# Patient Record
Sex: MALE | Race: ASIAN | Employment: OTHER | URBAN - METROPOLITAN AREA
[De-identification: names, ages, dates, MRNs, and addresses within clinical notes are randomized per-mention and may not be internally consistent; named-entity substitution may affect disease eponyms.]

---

## 2017-03-02 PROBLEM — M54.40 ACUTE LEFT-SIDED LOW BACK PAIN WITH SCIATICA: Status: RESOLVED | Noted: 2017-03-02 | Resolved: 2017-03-02

## 2017-03-02 PROBLEM — M54.40 ACUTE LEFT-SIDED LOW BACK PAIN WITH SCIATICA: Status: ACTIVE | Noted: 2017-03-02

## 2017-03-02 PROBLEM — M54.42 ACUTE LEFT-SIDED LOW BACK PAIN WITH LEFT-SIDED SCIATICA: Status: ACTIVE | Noted: 2017-03-02

## 2017-09-07 PROBLEM — R91.8 PULMONARY NODULES: Status: ACTIVE | Noted: 2017-09-07

## 2017-09-07 PROBLEM — M54.42 ACUTE LEFT-SIDED LOW BACK PAIN WITH LEFT-SIDED SCIATICA: Status: RESOLVED | Noted: 2017-03-02 | Resolved: 2017-09-07

## 2017-09-07 PROCEDURE — 82043 UR ALBUMIN QUANTITATIVE: CPT | Performed by: INTERNAL MEDICINE

## 2017-09-07 PROCEDURE — 82570 ASSAY OF URINE CREATININE: CPT | Performed by: INTERNAL MEDICINE

## 2017-12-22 PROCEDURE — 36415 COLL VENOUS BLD VENIPUNCTURE: CPT | Performed by: UROLOGY

## 2017-12-22 PROCEDURE — 84403 ASSAY OF TOTAL TESTOSTERONE: CPT | Performed by: UROLOGY

## 2017-12-22 PROCEDURE — 84402 ASSAY OF FREE TESTOSTERONE: CPT | Performed by: UROLOGY

## 2019-08-08 PROBLEM — N18.30 CKD (CHRONIC KIDNEY DISEASE) STAGE 3, GFR 30-59 ML/MIN (HCC): Status: ACTIVE | Noted: 2019-08-08

## 2019-10-18 PROBLEM — F10.29 ALCOHOL DEPENDENCE WITH UNSPECIFIED ALCOHOL-INDUCED DISORDER (HCC): Status: ACTIVE | Noted: 2019-10-18

## 2019-10-18 PROBLEM — N18.30 CKD (CHRONIC KIDNEY DISEASE) STAGE 3, GFR 30-59 ML/MIN (HCC): Status: RESOLVED | Noted: 2019-08-08 | Resolved: 2019-10-18

## 2019-10-18 PROBLEM — G31.9 CEREBRAL ATROPHY (HCC): Status: ACTIVE | Noted: 2019-10-18

## 2020-08-31 PROBLEM — E11.22 TYPE 2 DIABETES MELLITUS WITH STAGE 3 CHRONIC KIDNEY DISEASE, WITHOUT LONG-TERM CURRENT USE OF INSULIN (HCC): Status: ACTIVE | Noted: 2020-08-31

## 2020-08-31 PROBLEM — F03.90 DEMENTIA WITHOUT BEHAVIORAL DISTURBANCE, UNSPECIFIED DEMENTIA TYPE (HCC): Status: ACTIVE | Noted: 2020-08-31

## 2020-08-31 PROBLEM — N18.30 TYPE 2 DIABETES MELLITUS WITH STAGE 3 CHRONIC KIDNEY DISEASE, WITHOUT LONG-TERM CURRENT USE OF INSULIN (HCC): Status: ACTIVE | Noted: 2020-08-31

## 2020-08-31 PROBLEM — F03.90 DEMENTIA WITHOUT BEHAVIORAL DISTURBANCE, UNSPECIFIED DEMENTIA TYPE: Status: ACTIVE | Noted: 2020-08-31

## 2020-08-31 PROBLEM — F10.27 ALCOHOL DEPENDENCE WITH ALCOHOL-INDUCED PERSISTING DEMENTIA (HCC): Status: ACTIVE | Noted: 2020-08-31

## 2020-08-31 PROBLEM — I77.9 BILATERAL CAROTID ARTERY DISEASE, UNSPECIFIED TYPE (HCC): Status: ACTIVE | Noted: 2020-08-31

## 2021-02-26 PROBLEM — I65.23 CAROTID ATHEROSCLEROSIS, BILATERAL: Status: ACTIVE | Noted: 2021-02-26

## 2021-10-11 PROBLEM — F32.0 CURRENT MILD EPISODE OF MAJOR DEPRESSIVE DISORDER WITHOUT PRIOR EPISODE (HCC): Status: ACTIVE | Noted: 2021-10-11

## 2023-11-20 RX ORDER — MULTIVITAMIN WITH IRON
100 TABLET ORAL DAILY
COMMUNITY

## 2023-11-20 RX ORDER — GARLIC EXTRACT 500 MG
1 CAPSULE ORAL DAILY
COMMUNITY

## 2023-11-20 RX ORDER — MULTIVIT-MIN/IRON/FOLIC ACID/K 18-600-40
CAPSULE ORAL 2 TIMES DAILY
COMMUNITY

## 2023-11-22 ENCOUNTER — HOSPITAL ENCOUNTER (OUTPATIENT)
Facility: HOSPITAL | Age: 68
Setting detail: HOSPITAL OUTPATIENT SURGERY
Discharge: HOME | End: 2023-11-22
Attending: INTERNAL MEDICINE | Admitting: INTERNAL MEDICINE
Payer: MEDICARE

## 2023-11-22 VITALS
DIASTOLIC BLOOD PRESSURE: 84 MMHG | SYSTOLIC BLOOD PRESSURE: 103 MMHG | HEIGHT: 67 IN | WEIGHT: 140 LBS | RESPIRATION RATE: 11 BRPM | TEMPERATURE: 98 F | OXYGEN SATURATION: 99 % | HEART RATE: 64 BPM | BODY MASS INDEX: 21.97 KG/M2

## 2023-11-22 PROCEDURE — 0DBP8ZX EXCISION OF RECTUM, VIA NATURAL OR ARTIFICIAL OPENING ENDOSCOPIC, DIAGNOSTIC: ICD-10-PCS | Performed by: INTERNAL MEDICINE

## 2023-11-22 PROCEDURE — 0DB98ZX EXCISION OF DUODENUM, VIA NATURAL OR ARTIFICIAL OPENING ENDOSCOPIC, DIAGNOSTIC: ICD-10-PCS | Performed by: INTERNAL MEDICINE

## 2023-11-22 PROCEDURE — 88305 TISSUE EXAM BY PATHOLOGIST: CPT | Performed by: INTERNAL MEDICINE

## 2023-11-22 PROCEDURE — 99153 MOD SED SAME PHYS/QHP EA: CPT | Performed by: INTERNAL MEDICINE

## 2023-11-22 PROCEDURE — 99152 MOD SED SAME PHYS/QHP 5/>YRS: CPT | Performed by: INTERNAL MEDICINE

## 2023-11-22 PROCEDURE — 0DBE8ZX EXCISION OF LARGE INTESTINE, VIA NATURAL OR ARTIFICIAL OPENING ENDOSCOPIC, DIAGNOSTIC: ICD-10-PCS | Performed by: INTERNAL MEDICINE

## 2023-11-22 RX ORDER — SODIUM CHLORIDE, SODIUM LACTATE, POTASSIUM CHLORIDE, CALCIUM CHLORIDE 600; 310; 30; 20 MG/100ML; MG/100ML; MG/100ML; MG/100ML
INJECTION, SOLUTION INTRAVENOUS CONTINUOUS
Status: DISCONTINUED | OUTPATIENT
Start: 2023-11-22 | End: 2023-11-22

## 2023-11-22 RX ORDER — MIDAZOLAM HYDROCHLORIDE 1 MG/ML
INJECTION INTRAMUSCULAR; INTRAVENOUS
Status: DISCONTINUED | OUTPATIENT
Start: 2023-11-22 | End: 2023-11-22

## 2023-11-22 NOTE — OPERATIVE REPORT
PATIENT NAME: Kathrine Stout  MRN: [id number]  DATE OF OPERATION:  11/22/23  REFERRING PHYSICIAN: Dr. Yousuf Godinez  Medications:  [facility] [place]  DATE OF LAST COLONOSCOPY:  2013  PREOPERATIVE DIAGNOSIS:  Chronic Diarrhea  Weight loss  POSTOPERATIVE DIAGNOSIS:  Normal upper GI tract. Biopsies were done to assess for celiac disease. Normal right colon anastomosis. Diverticulosis were scattered throughout the colon. 5 mm rectal polyps x 2 were removed via cold biopsy forceps. Otherwise normal colon exam. Random colon biopsies were taken to assess for microscopic colitis. PROCEDURE PERFORMED:               Esophagogastroduodenoscopy with biopsies               Colonoscopy with biopsies and cold biopsy forceps polypectomy     Endoscopist: Shereen Dubon MD     PROCEDURE AND FINDINGS: The patient was placed into the left lateral decubitus after informed consent was obtained. All questions were answered. An updated history and physical were performed and an ASA score of 3 was assigned. Informed consent was obtained prior to the procedure, with review of possible complications including bleeding, infection, and missed polyps and or cancer. MAC sedation was administered. The Olympus video gastroscope was introduced into the mouth and passed into the esophagus after administration of topical oral anesthesia and MAC sedation. The entire esophagus was examined and was remarkable for normal mucosa. The entire examined stomach,  including retroflexion view was visualized and was remarkable for normal mucosa. The entire examined duodenum was visualized to the third portion and was remarkable for normal mucosa. Biopsies were done to assess for celiac disease. The gastroscope was removed from the patient. The procedure was completed. The patient tolerated the procedure well. The patient was turned around and a colonoscopy was performed. The video pediatric colonoscope was passed from anus to cecum.   The ileocecal valve, appendiceal orfice, hepatic and splenic flexures were all well visualized. The bowel preparation on the Aronchick bowel prep score was 1. (1 - excellent > 95% mucosa seen; 2 - good - clear liquid up to 25% of the mucosa, 90% mucosa seen;  3 - fair - semisolid stool not suctioned, but 90% of the mucosa seen; 4 - poor - semisolid stool not suctioned, but < 90% mucosa seen; 5 - inadequate - repeat prep needed)    Findings included Normal right colon anastomosis. Diverticulosis were scattered throughout the colon. 5 mm rectal polyps x 2 were removed via cold biopsy forceps. Otherwise normal colon exam. Random colon biopsies were taken to assess for microscopic colitis. On retroflexion of the scope in the anorectum, 10 normal internal hemorrhoids were noted. The scope withdrawal from cecum to anus was 27 minutes. RECOMMENDATIONS      The patient will be provided with a written summary of today's endoscopic findings  The patient will be notified with biopsy results in 2 - 4 working days. Recommendations regarding repeat colonoscopy will be made once the biopsy results are reviewed. Continue imodium as needed. Follow up in the office in 4 - 6 weeks.      Fly Perez MD, Gastroenterologist  Cc: Dr. Nina Oscar

## 2023-11-22 NOTE — BRIEF OP NOTE
Pre-Operative Diagnosis: WEIHT LOSS  ABDOMINAL PAIN, PERIUMBILICAL  DIARRHEA, UNSPECIFIED     Post-Operative Diagnosis: EGD: Normal EGD;  Colon: diverticulosis, normal right colon anastomosis      Procedure Performed:   ESOPHAGOGASTRODUODENOSCOPY (EGD) with biopsies, COLONOSCOPY with biopsies and forcep polypectomy    Surgeon(s) and Role:     * Luisana Liu MD - Primary    Assistant(s):        Surgical Findings: see above     Specimen: see op note     Estimated Blood Loss: No data recorded    Dictation Number:  none    Mackenzie Nobles MD  11/22/2023  4:19 PM

## 2023-11-22 NOTE — H&P
REFERRING PHYSICIAN: Dr. Amado Cheng     HPI:  Susy Dickerson is a 75year old male. Consult requested by Dr. Amaod Cheng for evaluation of chronic diarrhea, post prandial, abdominal pain, which started about 2 years ago, associated with weight loss. Symptoms started around time that donepezil and sertraline started. Bms 3 - 5 times per day, with some urgency, without melena or hematochezia. Has lost > 25# over the last 2 years. Pt with memory issues - wife was present and explaining pt's symptom. Other symptoms associated include none. no rectal bleeding and intermittent upper abdominal pain. Rare heartburn. Patient denies recent antibiotic use, no recent travel, no recent illness. Prior work up includes ct abdomen and pelvis and chest, celiac antibodies, and fecal calprotectin, cbc and cmp - all negative. No change in appetite. Forgets to eat sometimes. PAST GI HISTORY: diverticulitis with right hemicolectomy     Prior GI endoscopies colonoscopy 2013 - right colon anastomosis and small adenomatous polyp      Allergy: No Known Allergies         Current Outpatient Medications   Medication Sig Dispense Refill    rosuvastatin 10 MG Oral Tab Take 0.5 tablets (5 mg total) by mouth nightly. 45 tablet 3    sertraline 50 MG Oral Tab Take 0.5 tablets (25 mg total) by mouth daily. 90 tablet 2    DONEPEZIL 10 MG Oral Tab TAKE 1 TABLET(10 MG) BY MOUTH EVERY NIGHT 90 tablet 3    Tadalafil 20 MG Oral Tab Take 1 tablet (20 mg total) by mouth daily as needed for Erectile Dysfunction. 30 tablet 5    Calcium 500-125 MG-UNIT Oral Tab Take by mouth.        pyridoxine 100 MG Oral Tab Take 100 mg by mouth daily. Clindamycin Phosphate 1 % External Gel Apply  to cheek area at night for 5 days only 30 g 2    Vitamin B-12 1000 MCG Oral Tab Take 1,000 mcg by mouth daily.   0    Multivitamin Chewtab, ADULT, Oral Chew Tab Chew 1 tablet by mouth daily.    0    Ascorbic Acid (VITAMIN C OR) 1 tablet daily        ASPIRIN 81 MG OR TBDP 1 TABLET DAILY               Past Medical History:   Diagnosis Date    BPH W/O URINARY OBSTR 05/30/2008    CKD (chronic kidney disease) stage 3, GFR 30-59 ml/min ([facility]) 08/08/2019    Current mild episode of major depressive disorder without prior episode ([facility] [address] 10/11/2021    Dementia without behavioral disturbance, unspecified dementia type 08/31/2020    Diverticulitis of colon (without mention of hemorrhage)(562.11) 05/30/2008     had hemicolectomy in past    Hepatic cyst       seen on radiologic imaging    Hepatic steatosis       seen on US liver    ZIYAD (obstructive sleep apnea) 09/25/2023     HST AHI 7    [facility] 2012     seen on Chest CT- asymtpomatic    Pulmonary nodules 09/07/2017    Tobacco use disorder 06/05/2009    Type II or unspecified type diabetes mellitus without mention of complication, not stated as uncontrolled 03/11/2011     A1C was 6.8 in 2011             Past Surgical History:   Procedure Laterality Date    APPENDECTOMY        COLONOSCOPY   2002    COLONOSCOPY,BIOPSY   5/10/2013     Procedure: COLONOSCOPY;  Surgeon: Todd Cruz MD;  Location: [place] [address]   5/10/2013     Procedure: COLONOSCOPY;  Surgeon: Todd Cruz MD;  Location: [address] [place] [address]    OTHER SURGICAL HISTORY   6/2002     Right HEMICOLECTOMY for DIVERTICULITIS    TONSILLECTOMY             Social History    Tobacco Use      Smoking status: Some Days        Packs/day: 0.50        Years: 50.00        Additional pack years: 0.00        Total pack years: 25.00        Types: Cigarettes      Smokeless tobacco: Never    Alcohol use:  Yes      Alcohol/week: 14.0 - 21.0 standard drinks of alcohol      Types: 14 - 21 Standard drinks or equivalent per week      Comment: 4 alcoholic per day    Drug use: No     Occupation: manages other accountants  Marital status:   Aspirin and/ or NSAID use:  takes asa daily     FAMILY HX: GI HX: None, no hx of colon cancer        Family History   Problem Relation Age of Onset    Diabetes Father      Heart Disorder Father               Cancer Mother           CA BREAST    Hypertension Neg           REVIEW OF SYSTEMS:  GENERAL: feels well otherwise  SKIN: denies any unusual skin lesions  EYES: denies blurred vision or double vision  HEENT: denies nasal congestion, sinus pain or ST  LUNGS: denies shortness of breath with exertion  CARDIOVASCULAR: denies chest pain on exertion  GI: as above  : denies nocturia or changes in stream  MUSCULOSKELETAL: denies back pain  NEURO: denies headaches  PSYCHE: denies depression or anxiety  HEMATOLOGIC: denies hx of anemia  ENDOCRINE: denies thyroid history  ALL/ASTHMA: denies hx of allergy or asthma     EXAM:  There were no vitals taken for this visit. GENERAL: well developed, well nourished, in no apparent distress  SKIN: no rashes, no suspicious lesions  HEENT: atraumatic, normocephalic, ears and throat are clear  NECK: supple, no adenopathy, no bruits  CHEST: no chest tenderness  LUNGS: clear to auscultation  CARDIO: RRR without murmur  GI: good BS's and no masses, HSM or tenderness  RECTAL: Exam not done. MUSCULOSKELETAL: back is not tender,FROM of the back  EXTREMITIES: no cyanosis, clubbing or edema  DATE OF SERVICE: 2023   CT CHEST+ABDOMEN+PELVIS(ALL CNTRST ONLY)(TYP=97877/90804)     CLINICAL INDICATION: Weight loss. COMPARISON STUDY: 2019. TECHNIQUE:  Axial images of the chest, abdomen and pelvis were performed from the Lung apices   through the pubic symphysis. Oral contrast was Usedfor the entire exam.       CONTRAST: 80 mL Isovue-370     Automated exposure control and ALARA manual techniques for patient specific dose reduction were   followed while maintaining the necessary diagnostic image quality. ADVERSE REACTION: None. FINDINGS:         HEART/THORACIC AORTA: Nonaneurysmal thoracic aorta.  No significant pericardial effusion. Calcified   coronary artery atherosclerosis. MEDIASTINUM/ERICA:  No mediastinal or hilar lymphadenopathy. CHEST WALL/AXILLA: Unremarkable     LUNGS AND CENTRAL AIRWAYS: Patent trachea and central bronchi. No suspicious pulmonary nodules or confluent areas of consolidation. PLEURA: No significant pleural effusion. LIVER: Scattered hepatic cysts. Several subcentimeter hypodensities are too small for definitive   characterization but also likely represent small cysts. GALLBLADDER/BILIARY TREE: Cholelithiasis. PANCREAS: Normal     SPLEEN: Normal     ADRENAL GLANDS: Normal     KIDNEYS: Right superior pole renal cortical cyst.     ABDOMINAL AORTA: Calcified aortoiliac atherosclerosis. MESENTERY/RETROPERITONEUM: No lymphadenopathy. BOWEL: No bowel wall thickening, dilatation, or inflammation. PELVIC ORGANS: No abnormal pelvic masses. ABDOMINAL WALL: Unremarkable     OSSEOUS STRUCTURES: No suspicious lytic or blastic lesions. Impression   IMPRESSION:   1. No suspicious masses or lymphadenopathy   2.  Additional findings include cholelithiasis, calcified atherosclerosis of the aorta and coronary   arteries and hepatic and right renal cysts            Component      Latest Ref Rng 10/11/2023 10/13/2023   CLASS DESCRIPTION Comment      R829-UzB Milk      Class 0 kU/L <0.10      E321-VqH Codfish      Class 0 kU/L <0.10      P815-WxR Wheat      Class 0 kU/L <0.10      N529-MeJ Corn      Class 0 kU/L <0.10      W572-GzR Peanut      Class 0 kU/L <0.10      X520-YsK Soybean      Class 0 kU/L <0.10      F631-RvU Shrimp      Class 0 kU/L <0.10      I636-WbG Pork      Class 0 kU/L <0.10      I614-AtG Beef      Class 0 kU/L <0.10      E897-IvN Mussel      Class 0 kU/L <0.10      A691-IyQ Tuna      Class 0 kU/L <0.10      X441-LxP [place]      Class 0 kU/L <0.10      G010-LsO Chocolate/Cacao      Class 0 kU/L <0.10      W973-PuN Egg, Whole      Class 0 kU/L <0.10      Deamidated Gliadin Peptide IgG Qualitative      Negative  Negative      Deamidated Gliadin Peptide IgG Quant      U/mL 3.3      tissue Transglutaminase IgA Qualitative      Negative  Negative      tissue Transglutaminase IgA Quantitative      U/mL <0.5      Deamidated Gliadin Peptide IgA Qualitative      Negative  Negative      Deamidated Gliadin Peptide IgA Quantitative      U/mL <0.2      SED RATE      0 - 10 mm/hr 7      C-Reactive Protein      <=0.50 mg/dL <0.30      ENDOMYSIAL ANTIBODY IGA      Negative  Negative      IGA      87.0 - 352.0 mg/dL 98.0      Vitamin B12      211 - 946 pg/mL 1,107 (H)      Folate (Folic Acid)      1.70 - 24.20 ng/mL 11.60      Calprotectin, Fecal      0 - 120 ug/g   7         Assessment:    Chronic Diarrhea  Weight loss               Diarrhea has been present for about 2 years, and is associated with starting donepezil and sertraline. Last colonoscopy > 10 years ago. W/u thus far with ct abdomen and pelvis and chest and comprehensive labs negative. The etiology of the patient's  symptoms seems most consistent with progressive dementia with decreased oral intake. Hx of heavy alcohol intake - stopped in 2019. Will assess further for colon cancer, polyps, IBD, microscopic colitis. Will assess for gastroesophageal cancer, PUD, esophagitis - all to be ruled out. Prior work up has included as above. Plan:       1. Colon[facility] and [facility] with MAC [facility] as pt is ASA 3, will be scheduled within the next month or at the patient's earliest convenience. The risks of perforation, bleeding, and missed polyps or cancer were reviewed with the patient. 2.  Consider stopping sertraline / donepezil. 3.  May use imodium as needed.

## 2023-11-22 NOTE — DISCHARGE INSTRUCTIONS
ENDOSCOPY DISCHARGE INSTRUCTIONS    Procedure Performed:   Gastroscopy and Colonoscopy  Endoscopist: No name on file  FINDINGS:   Diverticulosis (pockets in colon that develop with age and lack of fiber intake), Colon polyp(s) (a growth in the colon), and Normal EGD    MEDICATIONS:  You may resume all other medications today    DIET:  General    BIOPSIES:  Biopsies were taken (you will be notified of results in 7-10 days)      ADDITIONAL RECOMMENDATIONS:  Recommendations regarding repeat colonoscopy will be made once the biopsy results are reviewed. Continue imodium as needed. Follow up in the office in 4 - 6 weeks. Activity for remainder of today:    REST TODAY  DO NOT drive or operate heavy machinery  DO NOT drink any alcoholic beverages  DO NOT sign any legal documents or make any important decisions    After your procedure(s): It is not unusual to feel bloated or gassy . Passing gas and belching is encouraged. Lying on your left side with your knees flexed may relieve the discomfort. A hot pack to the abdomen may also help. After your gastroscopy (upper endoscopy): You may experience a slight sore throat which will subside. Throat lozenges or salt water gargle can be used.     FOLLOW-UP:  Contact the office at [phone] for follow-up appointment if needed or if you develop any of the following:    Severe abdominal pain/discomfort       Excessive bleeding or black tarry stool  Difficulty breathing or swallowing        Persistent nausea,vomiting, or a fever above 100 degrees or chills

## (undated) DEVICE — GIJAW SINGLE-USE BIOPSY FORCEPS WITH NEEDLE: Brand: GIJAW

## (undated) DEVICE — KIT VLV 5 PC AIR H2O SUCT BX ENDOGATOR CONN

## (undated) DEVICE — BITEBLOCK ENDOSCP 60FR MAXI STRP

## (undated) DEVICE — 3M™ RED DOT™ MONITORING ELECTRODE WITH FOAM TAPE AND STICKY GEL, 50/BAG, 20/CASE, 72/PLT 2570: Brand: RED DOT™

## (undated) DEVICE — 10FT COMBINED O2 DELIVERY/CO2 MONITORING. FILTER WITH MICROSTREAM TYPE LUER: Brand: DUAL ADULT NASAL CANNULA

## (undated) DEVICE — 1200CC GUARDIAN II: Brand: GUARDIAN

## (undated) DEVICE — STERIS KITS